# Patient Record
Sex: MALE | Race: WHITE | Employment: FULL TIME | URBAN - METROPOLITAN AREA
[De-identification: names, ages, dates, MRNs, and addresses within clinical notes are randomized per-mention and may not be internally consistent; named-entity substitution may affect disease eponyms.]

---

## 2022-09-06 ENCOUNTER — ORDER TRANSCRIPTION (OUTPATIENT)
Dept: ADMINISTRATIVE | Facility: HOSPITAL | Age: 57
End: 2022-09-06

## 2022-09-06 DIAGNOSIS — R59.1 LYMPHADENOPATHY: Primary | ICD-10-CM

## 2022-09-06 DIAGNOSIS — Z11.59 SCREENING EXAMINATION FOR ARTHROPOD-BORNE VIRAL DISEASE: ICD-10-CM

## 2022-09-06 DIAGNOSIS — Z01.818 PRE-OP TESTING: ICD-10-CM

## 2022-09-23 ENCOUNTER — LAB ENCOUNTER (OUTPATIENT)
Dept: LAB | Age: 57
End: 2022-09-23
Attending: OTOLARYNGOLOGY

## 2022-09-23 DIAGNOSIS — Z01.818 PRE-OP TESTING: ICD-10-CM

## 2022-09-23 DIAGNOSIS — Z11.59 SCREENING EXAMINATION FOR ARTHROPOD-BORNE VIRAL DISEASE: ICD-10-CM

## 2022-09-24 LAB — SARS-COV-2 RNA RESP QL NAA+PROBE: NOT DETECTED

## 2022-09-26 ENCOUNTER — HOSPITAL ENCOUNTER (OUTPATIENT)
Dept: ULTRASOUND IMAGING | Facility: HOSPITAL | Age: 57
Discharge: HOME | End: 2022-09-26
Attending: OTOLARYNGOLOGY

## 2022-09-26 DIAGNOSIS — R59.1 LYMPHADENOPATHY: ICD-10-CM

## 2022-09-26 PROCEDURE — 88341 IMHCHEM/IMCYTCHM EA ADD ANTB: CPT | Performed by: OTOLARYNGOLOGY

## 2022-09-26 PROCEDURE — 88185 FLOWCYTOMETRY/TC ADD-ON: CPT | Performed by: OTOLARYNGOLOGY

## 2022-09-26 PROCEDURE — 88342 IMHCHEM/IMCYTCHM 1ST ANTB: CPT | Performed by: OTOLARYNGOLOGY

## 2022-09-26 PROCEDURE — 88184 FLOWCYTOMETRY/ TC 1 MARKER: CPT | Performed by: OTOLARYNGOLOGY

## 2022-09-26 PROCEDURE — 76942 ECHO GUIDE FOR BIOPSY: CPT | Performed by: OTOLARYNGOLOGY

## 2022-09-26 PROCEDURE — 88307 TISSUE EXAM BY PATHOLOGIST: CPT | Performed by: OTOLARYNGOLOGY

## 2022-09-26 PROCEDURE — 38505 NEEDLE BIOPSY LYMPH NODES: CPT | Performed by: OTOLARYNGOLOGY

## 2022-09-26 NOTE — PROCEDURES
[facility]  Procedure Note    Cristóbal Last Patient Status:  Outpatient    1965 MRN [id number]   [facility] ULTRASOUND Attending Pipo Gilmore MD   Hosp Day # 0 PCP Delano Banks MD     Procedure: US guided cervical lymph node biopsy    Pre-Procedure Diagnosis:  Cervical lymphadenpathy    Post-Procedure Diagnosis: same    Anesthesia:  Local    Findings:  Solid cores    Specimens: 6 18 g cores    Blood Loss:  <5 ml    Tourniquet Time: n/a  Complications:  None  Drains:  none    Secondary Diagnosis:  none    Freeman Ruiz MD  2022

## 2022-09-28 LAB
CD10 CELLS NFR SPEC: <1 %
CD10/CD19: <1 %
CD19 CELLS NFR SPEC: 57 %
CD19+/CD200+: 36 %
CD2 CELLS NFR SPEC: 35 %
CD20 CELLS NFR SPEC: 56 %
CD200 CELLS: 38 %
CD3 CELLS NFR SPEC: 34 %
CD3+/TCRGD+: <1 %
CD3+CD4+ CELLS NFR SPEC: 32 %
CD3+CD4+ CELLS/CD3+CD8+ CLL SPEC: 16
CD3+CD8+ CELLS NFR SPEC: 2 %
CD3-/CD56+: <1 %
CD34 CELLS NFR SPEC: <1 %
CD38 CELLS NFR SPEC: 1 %
CD45 CELLS NFR SPEC: 100 %
CD5 CELLS NFR SPEC: 46 %
CD5/CD19 CELLS: 4 %
CD7 CELLS NFR SPEC: 33 %
CELL SURF KAPPA/LAMBDA RATIO: 1.5
CELL SURF LAMBDA LIGHT CHAIN: 24 %
CELL SURFACE KAPPA LIGHT CHAIN: 36 %
TCR G-D CELLS NFR SPEC: <1 %

## 2023-02-13 ENCOUNTER — LAB ENCOUNTER (OUTPATIENT)
Dept: LAB | Age: 58
End: 2023-02-13
Attending: OTOLARYNGOLOGY
Payer: COMMERCIAL

## 2023-02-13 DIAGNOSIS — Z01.812 ENCOUNTER FOR PREOPERATIVE SCREENING LABORATORY TESTING FOR COVID-19 VIRUS: ICD-10-CM

## 2023-02-13 DIAGNOSIS — Z20.822 ENCOUNTER FOR PREOPERATIVE SCREENING LABORATORY TESTING FOR COVID-19 VIRUS: ICD-10-CM

## 2023-02-13 LAB — SARS-COV-2 RNA RESP QL NAA+PROBE: NOT DETECTED

## 2023-02-15 ENCOUNTER — ANESTHESIA EVENT (OUTPATIENT)
Dept: SURGERY | Facility: HOSPITAL | Age: 58
End: 2023-02-15
Payer: COMMERCIAL

## 2023-02-15 ENCOUNTER — ANESTHESIA (OUTPATIENT)
Dept: SURGERY | Facility: HOSPITAL | Age: 58
End: 2023-02-15
Payer: COMMERCIAL

## 2023-02-15 ENCOUNTER — HOSPITAL ENCOUNTER (OUTPATIENT)
Facility: HOSPITAL | Age: 58
Setting detail: HOSPITAL OUTPATIENT SURGERY
Discharge: HOME | End: 2023-02-15
Attending: OTOLARYNGOLOGY | Admitting: OTOLARYNGOLOGY
Payer: COMMERCIAL

## 2023-02-15 VITALS
WEIGHT: 165 LBS | OXYGEN SATURATION: 96 % | HEIGHT: 66 IN | RESPIRATION RATE: 18 BRPM | BODY MASS INDEX: 26.52 KG/M2 | DIASTOLIC BLOOD PRESSURE: 87 MMHG | TEMPERATURE: 97 F | SYSTOLIC BLOOD PRESSURE: 135 MMHG | HEART RATE: 54 BPM

## 2023-02-15 DIAGNOSIS — Z20.822 ENCOUNTER FOR PREOPERATIVE SCREENING LABORATORY TESTING FOR COVID-19 VIRUS: Primary | ICD-10-CM

## 2023-02-15 DIAGNOSIS — Z01.812 ENCOUNTER FOR PREOPERATIVE SCREENING LABORATORY TESTING FOR COVID-19 VIRUS: Primary | ICD-10-CM

## 2023-02-15 PROCEDURE — 07B20ZX EXCISION OF LEFT NECK LYMPHATIC, OPEN APPROACH, DIAGNOSTIC: ICD-10-PCS | Performed by: OTOLARYNGOLOGY

## 2023-02-15 PROCEDURE — 87102 FUNGUS ISOLATION CULTURE: CPT | Performed by: OTOLARYNGOLOGY

## 2023-02-15 PROCEDURE — 87206 SMEAR FLUORESCENT/ACID STAI: CPT | Performed by: OTOLARYNGOLOGY

## 2023-02-15 PROCEDURE — 88185 FLOWCYTOMETRY/TC ADD-ON: CPT | Performed by: OTOLARYNGOLOGY

## 2023-02-15 PROCEDURE — 88342 IMHCHEM/IMCYTCHM 1ST ANTB: CPT | Performed by: OTOLARYNGOLOGY

## 2023-02-15 PROCEDURE — 88184 FLOWCYTOMETRY/ TC 1 MARKER: CPT | Performed by: OTOLARYNGOLOGY

## 2023-02-15 PROCEDURE — 88341 IMHCHEM/IMCYTCHM EA ADD ANTB: CPT | Performed by: OTOLARYNGOLOGY

## 2023-02-15 PROCEDURE — 88307 TISSUE EXAM BY PATHOLOGIST: CPT | Performed by: OTOLARYNGOLOGY

## 2023-02-15 PROCEDURE — 88333 PATH CONSLTJ SURG CYTO XM 1: CPT | Performed by: OTOLARYNGOLOGY

## 2023-02-15 PROCEDURE — 87116 MYCOBACTERIA CULTURE: CPT | Performed by: OTOLARYNGOLOGY

## 2023-02-15 RX ORDER — LABETALOL HYDROCHLORIDE 5 MG/ML
10 INJECTION, SOLUTION INTRAVENOUS EVERY 10 MIN PRN
Status: DISCONTINUED | OUTPATIENT
Start: 2023-02-15 | End: 2023-02-15

## 2023-02-15 RX ORDER — PROCHLORPERAZINE EDISYLATE 5 MG/ML
5 INJECTION INTRAMUSCULAR; INTRAVENOUS EVERY 8 HOURS PRN
Status: DISCONTINUED | OUTPATIENT
Start: 2023-02-15 | End: 2023-02-15

## 2023-02-15 RX ORDER — ONDANSETRON 2 MG/ML
INJECTION INTRAMUSCULAR; INTRAVENOUS AS NEEDED
Status: DISCONTINUED | OUTPATIENT
Start: 2023-02-15 | End: 2023-02-15 | Stop reason: SURG

## 2023-02-15 RX ORDER — CEFAZOLIN SODIUM 1 G/3ML
INJECTION, POWDER, FOR SOLUTION INTRAMUSCULAR; INTRAVENOUS AS NEEDED
Status: DISCONTINUED | OUTPATIENT
Start: 2023-02-15 | End: 2023-02-15 | Stop reason: SURG

## 2023-02-15 RX ORDER — LIDOCAINE HYDROCHLORIDE 10 MG/ML
INJECTION, SOLUTION EPIDURAL; INFILTRATION; INTRACAUDAL; PERINEURAL AS NEEDED
Status: DISCONTINUED | OUTPATIENT
Start: 2023-02-15 | End: 2023-02-15 | Stop reason: SURG

## 2023-02-15 RX ORDER — SCOLOPAMINE TRANSDERMAL SYSTEM 1 MG/1
1 PATCH, EXTENDED RELEASE TRANSDERMAL ONCE
Status: DISCONTINUED | OUTPATIENT
Start: 2023-02-15 | End: 2023-02-15

## 2023-02-15 RX ORDER — NALOXONE HYDROCHLORIDE 0.4 MG/ML
80 INJECTION, SOLUTION INTRAMUSCULAR; INTRAVENOUS; SUBCUTANEOUS AS NEEDED
Status: DISCONTINUED | OUTPATIENT
Start: 2023-02-15 | End: 2023-02-15

## 2023-02-15 RX ORDER — SODIUM CHLORIDE, SODIUM LACTATE, POTASSIUM CHLORIDE, CALCIUM CHLORIDE 600; 310; 30; 20 MG/100ML; MG/100ML; MG/100ML; MG/100ML
INJECTION, SOLUTION INTRAVENOUS CONTINUOUS
Status: DISCONTINUED | OUTPATIENT
Start: 2023-02-15 | End: 2023-02-15

## 2023-02-15 RX ORDER — HYDROCODONE BITARTRATE AND ACETAMINOPHEN 5; 325 MG/1; MG/1
2 TABLET ORAL ONCE AS NEEDED
Status: DISCONTINUED | OUTPATIENT
Start: 2023-02-15 | End: 2023-02-15

## 2023-02-15 RX ORDER — ACETAMINOPHEN 500 MG
1000 TABLET ORAL ONCE
Status: DISCONTINUED | OUTPATIENT
Start: 2023-02-15 | End: 2023-02-15 | Stop reason: HOSPADM

## 2023-02-15 RX ORDER — ACETAMINOPHEN 500 MG
1000 TABLET ORAL EVERY 6 HOURS PRN
COMMUNITY
End: 2023-02-15

## 2023-02-15 RX ORDER — DEXAMETHASONE SODIUM PHOSPHATE 4 MG/ML
VIAL (ML) INJECTION AS NEEDED
Status: DISCONTINUED | OUTPATIENT
Start: 2023-02-15 | End: 2023-02-15 | Stop reason: SURG

## 2023-02-15 RX ORDER — MEPERIDINE HYDROCHLORIDE 25 MG/ML
12.5 INJECTION INTRAMUSCULAR; INTRAVENOUS; SUBCUTANEOUS AS NEEDED
Status: DISCONTINUED | OUTPATIENT
Start: 2023-02-15 | End: 2023-02-15

## 2023-02-15 RX ORDER — HYDROMORPHONE HYDROCHLORIDE 1 MG/ML
0.2 INJECTION, SOLUTION INTRAMUSCULAR; INTRAVENOUS; SUBCUTANEOUS EVERY 5 MIN PRN
Status: DISCONTINUED | OUTPATIENT
Start: 2023-02-15 | End: 2023-02-15

## 2023-02-15 RX ORDER — HYDROCODONE BITARTRATE AND ACETAMINOPHEN 5; 325 MG/1; MG/1
1 TABLET ORAL ONCE AS NEEDED
Status: DISCONTINUED | OUTPATIENT
Start: 2023-02-15 | End: 2023-02-15

## 2023-02-15 RX ORDER — MIDAZOLAM HYDROCHLORIDE 1 MG/ML
1 INJECTION INTRAMUSCULAR; INTRAVENOUS EVERY 5 MIN PRN
Status: DISCONTINUED | OUTPATIENT
Start: 2023-02-15 | End: 2023-02-15

## 2023-02-15 RX ORDER — MULTIVIT-MIN/IRON/FOLIC ACID/K 18-600-40
CAPSULE ORAL
COMMUNITY

## 2023-02-15 RX ORDER — HYDROCODONE BITARTRATE AND ACETAMINOPHEN 5; 325 MG/1; MG/1
1 TABLET ORAL EVERY 6 HOURS PRN
Qty: 20 TABLET | Refills: 0 | Status: SHIPPED | OUTPATIENT
Start: 2023-02-15

## 2023-02-15 RX ORDER — ONDANSETRON 2 MG/ML
4 INJECTION INTRAMUSCULAR; INTRAVENOUS EVERY 6 HOURS PRN
Status: DISCONTINUED | OUTPATIENT
Start: 2023-02-15 | End: 2023-02-15

## 2023-02-15 RX ORDER — SCOLOPAMINE TRANSDERMAL SYSTEM 1 MG/1
PATCH, EXTENDED RELEASE TRANSDERMAL
Status: DISCONTINUED
Start: 2023-02-15 | End: 2023-02-15

## 2023-02-15 RX ORDER — LIDOCAINE HYDROCHLORIDE AND EPINEPHRINE 10; 10 MG/ML; UG/ML
INJECTION, SOLUTION INFILTRATION; PERINEURAL AS NEEDED
Status: DISCONTINUED | OUTPATIENT
Start: 2023-02-15 | End: 2023-02-15 | Stop reason: HOSPADM

## 2023-02-15 RX ORDER — HYDROMORPHONE HYDROCHLORIDE 1 MG/ML
0.4 INJECTION, SOLUTION INTRAMUSCULAR; INTRAVENOUS; SUBCUTANEOUS EVERY 5 MIN PRN
Status: DISCONTINUED | OUTPATIENT
Start: 2023-02-15 | End: 2023-02-15

## 2023-02-15 RX ORDER — RIBOFLAVIN (VITAMIN B2) 100 MG
100 TABLET ORAL DAILY
COMMUNITY
End: 2023-02-15

## 2023-02-15 RX ORDER — ACETAMINOPHEN 500 MG
1000 TABLET ORAL ONCE AS NEEDED
Status: DISCONTINUED | OUTPATIENT
Start: 2023-02-15 | End: 2023-02-15

## 2023-02-15 RX ORDER — HYDROMORPHONE HYDROCHLORIDE 1 MG/ML
0.6 INJECTION, SOLUTION INTRAMUSCULAR; INTRAVENOUS; SUBCUTANEOUS EVERY 5 MIN PRN
Status: DISCONTINUED | OUTPATIENT
Start: 2023-02-15 | End: 2023-02-15

## 2023-02-15 RX ADMIN — CEFAZOLIN SODIUM 2 G: 1 INJECTION, POWDER, FOR SOLUTION INTRAMUSCULAR; INTRAVENOUS at 07:21:00

## 2023-02-15 RX ADMIN — DEXAMETHASONE SODIUM PHOSPHATE 8 MG: 4 MG/ML VIAL (ML) INJECTION at 07:17:00

## 2023-02-15 RX ADMIN — LIDOCAINE HYDROCHLORIDE 50 MG: 10 INJECTION, SOLUTION EPIDURAL; INFILTRATION; INTRACAUDAL; PERINEURAL at 07:08:00

## 2023-02-15 RX ADMIN — ONDANSETRON 4 MG: 2 INJECTION INTRAMUSCULAR; INTRAVENOUS at 07:17:00

## 2023-02-15 RX ADMIN — SODIUM CHLORIDE, SODIUM LACTATE, POTASSIUM CHLORIDE, CALCIUM CHLORIDE: 600; 310; 30; 20 INJECTION, SOLUTION INTRAVENOUS at 08:29:00

## 2023-02-15 NOTE — H&P
Pre-op Diagnosis: lymphadenopathy left neck mass    The H&P dated 1/19/23 by Dr. Whitney Hannah was reviewed by Leonor Her MD on 2/15/2023, the patient was examined and no significant changes have occurred in the patient's condition since the H&P was performed. I discussed with the patient and/or legal representative the potential benefits, risks and side effects of this procedure; the likelihood of the patient achieving goals; and potential problems that might occur during recuperation. I discussed reasonable alternatives to the procedure, including risks, benefits and side effects related to the alternatives and risks related to not receiving this procedure. We will proceed with procedure as planned.

## 2023-02-15 NOTE — ANESTHESIA PROCEDURE NOTES
Airway  Date/Time: 2/15/2023 7:09 AM  Urgency: elective      General Information and Staff    Patient location during procedure: OR  Anesthesiologist: Carol Rosas MD  Performed: anesthesiologist   Performed by: Carol Rosas MD  Authorized by:  Carol Rosas MD      Indications and Patient Condition  Indications for airway management: anesthesia  Sedation level: deep  Preoxygenated: yes  Patient position: sniffing  Mask difficulty assessment: 1 - vent by mask    Final Airway Details  Final airway type: endotracheal airway      Successful airway: ETT  Cuffed: yes   Successful intubation technique: direct laryngoscopy  Facilitating devices/methods: intubating stylet  Endotracheal tube insertion site: oral  Blade: Linda  Blade size: #3  ETT size (mm): 7.5    Cormack-Lehane Classification: grade IIA - partial view of glottis  Placement verified by: chest auscultation and capnometry   Measured from: lips  ETT to lips (cm): 22  Number of attempts at approach: 1    Additional Comments  Atraumatic

## 2023-02-15 NOTE — ANESTHESIA POSTPROCEDURE EVALUATION
[address] [facility] Patient Status:  Hospital Outpatient Surgery   Age/Gender 59year old male MRN [id number]   [facility] SURGERY Attending Richar Boggs MD   Hosp Day # 0 PCP Raiza Salguero MD       Anesthesia Post-op Note    EXCISION LEFT NECK LYMPH NODE    Procedure Summary     Date: 02/15/23 Room / Location: [address] [place] [address] / [address] [place] [address] MAIN OR    Anesthesia Start: 4872 Anesthesia Stop: 7352    Procedure: EXCISION LEFT NECK LYMPH NODE (Left) Diagnosis: (lymphadenopathy left neck mass)    Surgeons: Richar Boggs MD Anesthesiologist: Solange Corrales MD    Anesthesia Type: general ASA Status: 1          Anesthesia Type: general    Vitals Value Taken Time   /64 02/15/23 0827   Temp 97.7 02/15/23 0829   Pulse 78 02/15/23 0829   Resp 17 02/15/23 0829   SpO2 99 % 02/15/23 0829   Vitals shown include unvalidated device data. Patient Location: PACU    Anesthesia Type: general    Airway Patency: patent    Postop Pain Control: adequate    Mental Status: preanesthetic baseline    Nausea/Vomiting: none    Cardiopulmonary/Hydration status: stable euvolemic    Complications: no apparent anesthesia related complications    Postop vital signs: stable    Dental Exam: Unchanged from Preop    Patient to be discharged from PACU when criteria met.

## 2023-02-15 NOTE — OPERATIVE REPORT
DATE: 2/15/23    PREOPERATIVE DIAGNOSIS:   1. Left cervical lymphadenopathy    POSTOPERATIVE DIAGNOSIS:  1. Left cervical lymphadenopathy    PROCEDURE:   1. Excision of left cervical node with double layered closure    SURGEON: Elvis Bertrand MD     ASSISTANT: Luís Galaviz MD    ANESTHESIA: General.     INDICATION: The patient is a 59year old male who presents with a left neck mass. This has been present for several months. FNA and core needle biopsy demonstrated benign tissue, but lymphoma cannot be ruled out. Excisional of the node was recommended. The patient decided to go ahead with neck mass excision, giving written consent. FINDINGS: left large cervical node measuring 3-4 cm in size    DESCRIPTION OF PROCEDURE: The patient was identified in the preoperative holding area and again in the operating room. The patient was moved from the stretcher to the operating room table and turned over to Anesthesia for sedation and intubation. The patient was sedated and intubated without complication. A time-out was performed confirming the patient's name, age, date of birth, procedure, and allergies. The patient was then turned over to the surgeon for the procedure. Attention was turned to the left neck. Patient was prepped and draped in a sterile position. The lesion and incision were marked. 1% lidocaine with 1/100,000 of epinephrine was injected at the incision site. A 15 blade scalpel was used to make an incision and dissect through subcutaneous tissue. Hemostasis was achieved with electrocautery. A Mosquito clamp and Bhaskar forceps were used to dissect deep tissue and reflect the sternocleidomastoid anteriorly until the lesion was identified. A Nyla retractor was used for visualization. The lesion was then carefully dissected superiorly, inferiorly, medially and laterally. The lesion was gently retracted and the deep tissue was dissected off. The lesion was removed and sent for pathologic evaluation.  A small amount of the node was adherent to the deep tissue and could not be removed. The area was then irrigated. Additional hemostasis was achieved with electrocautery as needed. The deep tissue was reapproximated with 3.0 Vicryl suture. The dermis was reapproximated with 4.0 vicryl. Skin was closed with dermabond. The patient was turned over to anesthesia for wake-up. Patient woke up without complications. she was transferred from the operating room table to the stretcher and from the stretcher to the PACU in stable condition. SPECIMENS: left neck mass. ESTIMATED BLOOD LOSS: 15 mls. COMPLICATIONS: None. PLAN: patient will take pain medication and antibiotics as prescribed and follow up in 1 week for post-op evaluation and discussion of pathology.

## 2023-02-21 LAB
CD10 CELLS NFR SPEC: <1 %
CD10/CD19: <1 %
CD19 CELLS NFR SPEC: 57 %
CD19+/CD200+: 8 %
CD2 CELLS NFR SPEC: 39 %
CD20 CELLS NFR SPEC: 58 %
CD200 CELLS: 8 %
CD3 CELLS NFR SPEC: 39 %
CD3+/TCRGD+: 1 %
CD3+CD4+ CELLS NFR SPEC: 36 %
CD3+CD4+ CELLS/CD3+CD8+ CLL SPEC: 18
CD3+CD8+ CELLS NFR SPEC: 2 %
CD3-/CD56+: 1 %
CD34 CELLS NFR SPEC: <1 %
CD38 CELLS NFR SPEC: <1 %
CD45 CELLS NFR SPEC: 100 %
CD5 CELLS NFR SPEC: 44 %
CD5/CD19 CELLS: 4 %
CD7 CELLS NFR SPEC: 41 %
CELL SURF KAPPA/LAMBDA RATIO: 1.3
CELL SURF LAMBDA LIGHT CHAIN: 26 %
CELL SURFACE KAPPA LIGHT CHAIN: 35 %
TCR G-D CELLS NFR SPEC: 2 %

## (undated) DEVICE — UNDYED BRAIDED (POLYGLACTIN 910), SYNTHETIC ABSORBABLE SUTURE: Brand: COATED VICRYL

## (undated) DEVICE — ABSORBABLE HEMOSTAT (OXIDIZED REGENERATED CELLULOSE, U.S.P.): Brand: SURGICEL

## (undated) DEVICE — SPONGE: SPECIALTY PEANUT XR 100/CS: Brand: MEDICAL ACTION INDUSTRIES

## (undated) DEVICE — SUT MONOCRYL 4-0 PS-2 Y496G

## (undated) DEVICE — SUT MONOCRYL 4-0 P-3 Y494G

## (undated) DEVICE — STERILE SYNTHETIC POLYISOPRENE POWDER-FREE SURGICAL GLOVES WITH HYDROGEL COATING, SMOOTH FINISH, STRAIGHT FINGER: Brand: PROTEXIS

## (undated) DEVICE — SOL NACL IRRIG 0.9% 1000ML BTL

## (undated) DEVICE — YANKAUER,FLEXIBLE HANDLE,REGLR CAPACITY: Brand: MEDLINE INDUSTRIES, INC.

## (undated) DEVICE — SLEEVE KENDALL SCD EXPRESS MED

## (undated) DEVICE — CABLE BIPOLAR 12FT DISPOSABLE

## (undated) DEVICE — CLOSURE EXOFIN 1.0ML

## (undated) DEVICE — SUT VICRYL 3-0 SH J416H

## (undated) DEVICE — LIGHT HANDLE

## (undated) DEVICE — MEGADYNE E-Z CLEAN BLADE 2.75"

## (undated) DEVICE — HEAD AND NECK CDS-LF: Brand: MEDLINE INDUSTRIES, INC.

## (undated) DEVICE — SUT SILK 3-0 A304H

## (undated) DEVICE — MEDI-VAC SUCTION HANDLE REGULAR CAPACITY: Brand: CARDINAL HEALTH

## (undated) DEVICE — CHLORAPREP ORANGE TINT 10.5ML

## (undated) NOTE — LETTER
OUTSIDE TESTING RESULT REQUEST     IMPORTANT: FOR YOUR IMMEDIATE ATTENTION  Please FAX all test results listed below to: [phone]     Testing already done on or about:      * * * * If testing is NOT complete, arrange with patient A.S.A.P. * * * *      Patient Name: Rach Ribera  Surgery Date: 2/15/2023  CSN: [id number]  Medical Record: [id number]   : 1965 - A: 59 y      Sex: male  Surgeon(s):  Merle Ward MD  Procedure: EXCISION LEFT NECK LYMPH NODE  Anesthesia Type: General     Surgeon: Merle Ward MD     The following Testing and Time Line are REQUIRED PER ANESTHESIA     COVID PCR TEST BY 23, MONDAY      Thank You,   Sent by: Laurel Dobson